# Patient Record
Sex: FEMALE | Race: WHITE
[De-identification: names, ages, dates, MRNs, and addresses within clinical notes are randomized per-mention and may not be internally consistent; named-entity substitution may affect disease eponyms.]

---

## 2021-09-20 ENCOUNTER — HOSPITAL ENCOUNTER (OUTPATIENT)
Dept: HOSPITAL 11 - JP.SDS | Age: 41
LOS: 1 days | Discharge: HOME | End: 2021-09-21
Attending: ORTHOPAEDIC SURGERY
Payer: COMMERCIAL

## 2021-09-20 DIAGNOSIS — Z79.899: ICD-10-CM

## 2021-09-20 DIAGNOSIS — Z20.822: ICD-10-CM

## 2021-09-20 DIAGNOSIS — Z01.812: ICD-10-CM

## 2021-09-20 DIAGNOSIS — M17.11: Primary | ICD-10-CM

## 2021-09-20 PROCEDURE — 80053 COMPREHEN METABOLIC PANEL: CPT

## 2021-09-20 PROCEDURE — U0002 COVID-19 LAB TEST NON-CDC: HCPCS

## 2021-09-20 PROCEDURE — 97161 PT EVAL LOW COMPLEX 20 MIN: CPT

## 2021-09-20 PROCEDURE — 97110 THERAPEUTIC EXERCISES: CPT

## 2021-09-20 PROCEDURE — C1776 JOINT DEVICE (IMPLANTABLE): HCPCS

## 2021-09-20 PROCEDURE — 97530 THERAPEUTIC ACTIVITIES: CPT

## 2021-09-20 PROCEDURE — 27446 REVISION OF KNEE JOINT: CPT

## 2021-09-20 PROCEDURE — 97535 SELF CARE MNGMENT TRAINING: CPT

## 2021-09-20 PROCEDURE — 81025 URINE PREGNANCY TEST: CPT

## 2021-09-20 PROCEDURE — 97165 OT EVAL LOW COMPLEX 30 MIN: CPT

## 2021-09-20 PROCEDURE — 36415 COLL VENOUS BLD VENIPUNCTURE: CPT

## 2021-09-20 PROCEDURE — C1713 ANCHOR/SCREW BN/BN,TIS/BN: HCPCS

## 2021-09-20 PROCEDURE — 73560 X-RAY EXAM OF KNEE 1 OR 2: CPT

## 2021-09-20 PROCEDURE — 87635 SARS-COV-2 COVID-19 AMP PRB: CPT

## 2021-09-20 PROCEDURE — 85027 COMPLETE CBC AUTOMATED: CPT

## 2021-09-20 RX ADMIN — OXYCODONE HYDROCHLORIDE AND ACETAMINOPHEN PRN TAB: 5; 325 TABLET ORAL at 12:09

## 2021-09-20 RX ADMIN — OXYCODONE HYDROCHLORIDE AND ACETAMINOPHEN PRN TAB: 5; 325 TABLET ORAL at 16:15

## 2021-09-20 RX ADMIN — Medication SCH APPLIC: at 08:09

## 2021-09-20 RX ADMIN — OXYCODONE HYDROCHLORIDE AND ACETAMINOPHEN PRN TAB: 5; 325 TABLET ORAL at 20:20

## 2021-09-20 RX ADMIN — Medication SCH APPLIC: at 20:19

## 2021-09-20 RX ADMIN — Medication SCH: at 14:55

## 2021-09-20 NOTE — CR
Knee 1V or 2V Rt

 

CLINICAL HISTORY: Arthroplasty

 

FINDINGS: Patient is status post the medial hemiarthroplasty. Components appear

well seated. There is intra-articular and subcutaneous air.

## 2021-09-21 VITALS — SYSTOLIC BLOOD PRESSURE: 107 MMHG | HEART RATE: 66 BPM | DIASTOLIC BLOOD PRESSURE: 62 MMHG

## 2021-09-21 RX ADMIN — OXYCODONE HYDROCHLORIDE AND ACETAMINOPHEN PRN TAB: 5; 325 TABLET ORAL at 05:15

## 2021-09-21 RX ADMIN — OXYCODONE HYDROCHLORIDE AND ACETAMINOPHEN PRN TAB: 5; 325 TABLET ORAL at 00:42

## 2021-09-21 RX ADMIN — OXYCODONE HYDROCHLORIDE AND ACETAMINOPHEN PRN TAB: 5; 325 TABLET ORAL at 13:26

## 2021-09-21 RX ADMIN — Medication SCH APPLIC: at 10:00

## 2021-09-21 RX ADMIN — OXYCODONE HYDROCHLORIDE AND ACETAMINOPHEN PRN TAB: 5; 325 TABLET ORAL at 17:17

## 2021-09-21 RX ADMIN — OXYCODONE HYDROCHLORIDE AND ACETAMINOPHEN PRN TAB: 5; 325 TABLET ORAL at 09:16

## 2021-09-21 NOTE — PCM.[id number]
**Discharge Summary





- Hospital Course


Brief History: Patient is a pleasant 39 y/o female, chronic right knee pain with

symptoms refractory to conservative management. Xrays revealed osteoarthritic 

changes of the medial compartment, well preserved spacing of the lateral and 

patellofemoral compartments. Elected to undergo a partial knee arthroplasty of 

the medial compartment. Tolerated surgery well with no complications. No acute 

events post-operatively. Discharged to home with outpatient physical therapy 

orders.


Diagnosis: Stroke: No


Modified [place] Scale: No Symptoms at All


Modified [place] Scale Score: 0





- Discharge Data


Discharge Date: 09/21/21


Discharge Disposition: Home, Self-Care 01


Condition: Good





- Referral to Home Health


Date of Face to Face Encounter: 09/21/21


Reason for Homebound Status: motivated to go home. Independent with ADLs


Primary Care Physician: 


Patience Richard MD








- Discharge Diagnosis/Problem(s)


(1) Status post right partial knee replacement


SNOMED Code(s): 025703606, [id number], [id number], [id number]


   ICD Code: Z96.651 - PRESENCE OF RIGHT ARTIFICIAL KNEE JOINT   Status: Acute  

Current Visit: Yes   





- Patient Summary/Data


Operative Procedure(s) Performed: right partial knee medial compartment 

arthroplasty


Consults: 


                                  Consultations





09/20/21 11:09


Consult to Case Management/ [CONS] Routine 


   Comment: 


   Physician Instructions: 


   Service(s) to be Consulted: Case Management


   Reason for Consult: Plan for Discharge


OT Evaluation and Treatment [CONS] Routine 


   Please Evaluate and Treat.


   OT Reason for Consult: ADL's


   Special Instructions: s/p right medial partial knee arthroplasty


   This query below is only for informational purposes and is not editable.


PT Evaluation and Treatment [CONS] Routine 


   Please Evaluate and Treat.


   PT Reason for Consult: Post op Ortho Surgery


   Special Instructions: s/p right medial partial knee arthroplasty


   This query below is only for informational purposes and is not editable.


PT Evaluation and Treatment [CONS] Routine 


   Please Evaluate and Treat.


   PT Reason for Consult: Post op Ortho Surgery Knee


   Pending Discharge: Yes, 1- 2 days


   Special Instructions: Schedule first outpatient PT appointment in 3-5 day 

post discharge.


   This query below is only for informational purposes and is not editable.











Hospital Course: 





Patient is a pleasant 40-year-old female, status post right knee medial 

compartment arthroplasty. Patient tolerated surgery well with no complications. 

No acute events during the postoperative hospitalization. Patient was 

hemodynamically stable throughout hospital stay, vitals remained within 

acceptable limits. Postop day #1 hemoglobin stable at 12.0. White count slightly

 elevated at 11.4. Patient denied subjective fevers, chills, fatigue, dyspnea, 

nor chest pain. Has been using incentive spirometer. 





Patient does take naltrexone per baseline. This medication was held 

postoperatively to allow for adequate utilization of pain control medications. 

Did initially have some nausea with administration of Percocet, but this impr

jony when medication was administered with food. Patient tolerated regular diet 

well. Denied any further episodes of nausea or emesis. 





Patient participated in physical therapy daily while hospitalized. Patient's 

ambulation abilities progressed nicely, utilizing four-wheel walker and 

ambulating up to 100 ft. Was able to safely demonstrate ability to complete 

stairs with therapy prior to discharge, as patient has 19 stairs in her house. 

Demonstrated competency with transfers from bed to chair. Demonstrated 

competency completing ADLs with minimal assistance. 





IV was saline locked the evening of surgery. Dressing change performed postop 

day #1 by orthopedic provider.





Exam: R LE neurovascular intact. Posterior tibial pulse, 2+. Negative Homans 

sign. Postoperative diffuse swelling of knee, extending into calf. No 

significant pedal edema. Minimal ecchymosis along medial knee and calf. Incision

 is well approximated, steristrips intact above incision with mild dried richie

inage. No surrounding erythema nor active drainage. Mild warmth to touch of 

knee. ROM: 4-60. 








- Patient Instructions


Diet: Regular Diet as Tolerated


Activity: Apply Ice, Elevate Extremity, Full Weight Bearing, No Strenuous 

Activities


Driving: Do Not Drive


Showering/Bathing: Shower in AM


Wound/Incision Care: Keep Operative Site/Wound Site Clean and Dry, Change 

Dressing Daily (leave dressing on through 9/22/21. May then remove for shower. 

If no active drainage or irritation, does not need to change dressing daily. )


Notify Provider of: Fever, Increased Pain, Swelling and Redness, Drainage





- Discharge Plan


*PRESCRIPTION DRUG MONITORING PROGRAM REVIEWED*: Yes


*COPY OF PRESCRIPTION DRUG MONITORING REPORT IN PATIENT NINFA: Not Applicable


Prescriptions/Med Rec: 


Aspirin [Aspirin EC] 325 mg PO BID #60


Acetaminophen/oxyCODONE [Percocet 325-5 MG] 1 - 2 tab PO Q6HR PRN #40 tab


 PRN Reason: Pain


Home Medications: 


                                    Home Meds





Cholecalciferol (Vitamin D3) [Vitamin D3] 5,000 unit PO DAILY 01/02/20 [History]


Progesterone, Micronized [Progesterone] 100 mg PO DAILY 01/02/20 [History]


Sertraline [Zoloft] 100 mg PO DAILY 01/02/20 [History]


Naltrexone 50 mg PO DAILY 08/26/21 [History]


Progesterone/Testosterone 1 trouche PO DAILY 09/20/21 [History]


Vit A/D3/E/Iod/Zinc/Selen/Herb [Medcaps T3 Capsule] 1 tab PO DAILY 09/20/21 

[History]


Acetaminophen/oxyCODONE [Percocet 325-5 MG] 1 - 2 tab PO Q6HR PRN #40 tab 

09/21/21 [Rx]


Aspirin [Aspirin EC] 325 mg PO BID #60 09/21/21 [Rx]








Oxygen Therapy Mode: Room Air


Patient Handouts:  Preventing Problems After Surgery, How to Prevent 

Constipation After Surgery, Partial Knee Replacement, Care After


Referrals: 


Deny Nolen, PT [Physical Therapist] - 09/28/21 1:00 pm (Please arrive 15 

minutes early to register for your appointment.)


Zeny Ewing PA [Ordering Only Provider] - 10/05/21 1:00 pm (Please arrive 15

minutes early to register for your appointment.)





- Discharge Summary/Plan Comment


DC Time >30 min.: No


Total # of Minutes for Discharge Time: 





20


Discharge Summary/Plan Comment: 





-Discharge to home this afternoon. Outpatient PT has been arranged


-Prescription sent for pain control: 5mg-325mg Percocet, 1-2 tabs PO q6 hrs prn 

for pain. Instructed to discontinue home-Naltrexone while on this medication. 

Wait 3-4 days after discontinuing pain medication prior to resuming Naltrexone


-Encouraged continuation of stool softener while on opioid pain medication 


-Educated patient on warning signs of DVT/VTE and SSI; any concerns, she should 

contact the clinic or visit her local ER


-Educated to take 1 aspirin (81 mg or 325 mg) BID for DVT/VTE prophylaxis


-Continue with Nozin spray BID  


-May shower; leave Steristrips on and let water run over the top. They will fall

off in 5-7 days. Do not vigorously scrub incision, no submerging incision in 

bath water. Do not need to place dressing over incision, but may choose to do so

if Steristrips are catching on clothing. 


-Follow up with orthopedics in 2 weeks; contact clinic with any concerns or 

questions that arise prior to scheduled apt. 





- General Info


Date of Service: 09/21/21


Admission Dx/Problem (Free Text: 





s/p right knee partial arthroplasty 


Functional Status: Reports: Pain Controlled, Tolerating Diet, Ambulating (with 

FWW), Urinating, Incentive Spirometry





- Review of Systems


General: Denies: Fever, Fatigue, Malaise, Chills


Pulmonary: Denies: Shortness of Breath


Cardiovascular: Denies: Chest Pain, Palpitations, Lightheadedness


Gastrointestinal: Reports: No Symptoms


Genitourinary: Reports: No Symptoms


Musculoskeletal: Reports: Leg Pain (right ), Joint Pain (right knee ), Joint 

Swelling (right knee )


Skin: Reports: Bruising


Neurological: Reports: No Symptoms


Psychiatric: Reports: No Symptoms





- Patient Data


Vitals - Most Recent: 


                                Last Vital Signs











Temp  96.2 F L  09/21/21 07:21


 


Pulse  66   09/21/21 07:21


 


Resp  16   09/21/21 07:21


 


BP  107/62   09/21/21 07:21


 


Pulse Ox  97   09/21/21 07:21











Weight - Most Recent: 171 lb


Lab Results - Last 24 hrs: 


                         Laboratory Results - last 24 hr











  09/21/21 Range/Units





  05:54 


 


WBC  11.4 H  (4.5-11.0)  K/uL


 


RBC  4.07  (3.30-5.50)  M/uL


 


Hgb  12.0  (12.0-15.0)  g/dL


 


Hct  35.4 L  (36.0-48.0)  %


 


MCV  87  (80-98)  fL


 


MCH  30  (27-31)  pg


 


MCHC  34  (32-36)  %


 


Plt Count  279  (150-400)  K/uL











Med Orders - Current: 


                               Current Medications





Acetaminophen (Acetaminophen 325 Mg Tab)  650 mg PO Q4H PRN


   PRN Reason: Pain/Fever


Hydrocodone Bitart/Acetaminophen (Acetaminophen/Hydrocodone 325-5 Mg Tab)  1 tab

PO Q4H PRN


   PRN Reason: Pain (mild 1-3)


Bandage/Support Products (Nozin Nasal )  1 applic NASBOTH BID CARINE


   Last Admin: 09/21/21 10:00 Dose:  1 applic


   Documented by: 


Docusate Sodium (Docusate Sodium 100 Mg Cap)  100 mg PO BID [facility]


   Last Admin: 09/21/21 09:59 Dose:  100 mg


   Documented by: 


Enoxaparin Sodium (Enoxaparin 30 Mg/0.3 Ml Syringe)  30 mg SUBCUT DAILY [facility]


   Last Admin: 09/21/21 09:58 Dose:  30 mg


   Documented by: 


Sodium Chloride (Normal Saline)  1,000 mls @ 125 mls/hr IV ASDIRECTED [facility]


Ketorolac Tromethamine (Ketorolac 30 Mg/Ml Sdv)  15 mg IVPUSH Q8H PRN


   PRN Reason: Breakthrough Pain


   Stop: 09/24/21 11:10


   Last Admin: 09/20/21 13:42 Dose:  15 mg


   Documented by: 


Morphine Sulfate (Morphine 2 Mg/Ml Syringe)  1 mg IVPUSH Q1H PRN


   PRN Reason: Breakthrough Pain


   Last Admin: 09/20/21 14:41 Dose:  1 mg


   Documented by: 


Non-Formulary Medication (Progesterone, Micronized [Progesterone])  100 mg PO 

DAILY [facility]


Progesterone/Testosterone Trouche **Ptom**  0 trouche BUCCAL BEDTIME [facility]


Ondansetron HCl (Ondansetron 4 Mg/2 Ml Sdv)  4 mg IVPUSH Q6H PRN


   PRN Reason: Nausea/Vomiting


Oxycodone/Acetaminophen (Acetaminophen/Oxycodone 325-5 Mg Tab)  1 - 2 tab PO Q4H

 PRN


   PRN Reason: Pain


   Last Admin: 09/21/21 09:16 Dose:  2 tab


   Documented by: 


Sertraline HCl (Sertraline 50 Mg Tab)  100 mg PO DAILY [facility]


   Last Admin: 09/21/21 09:58 Dose:  100 mg


   Documented by: 





Discontinued Medications





Bandage/Support Products (Nozin Nasal )  1 applic NASBOTH BID [facility]


   Last Admin: 09/20/21 14:55 Dose:  Not Given


   Documented by: 


Bupivacaine HCl (Bupivacaine 0.5% 30 Ml Sdv) Confirm Administered Dose 30 ml 

.ROUTE .STK-MED ONE


   Stop: 09/20/21 06:50


Fentanyl (Fentanyl 100 Mcg/2 Ml Sdv) Confirm Administered Dose 100 mcg .ROUTE 

.STK-MED ONE


   Stop: 09/20/21 08:38


Cefazolin Sodium/Dextrose 2 gm (/ Premix)  50 mls @ 100 mls/hr IV ONETIME ONE


   Stop: 09/20/21 08:59


   Last Admin: 09/20/21 09:35 Dose:  100 mls/hr


   Documented by: 


Lactated Ringer's (Ringers, Lactated)  1,000 mls @ 75 mls/hr IV ASDIRECTED [facility]


   Last Admin: 09/20/21 08:03 Dose:  75 mls/hr


   Documented by: 


Lactated Ringer's (Ringers, Lactated) Confirm Administered Dose 1,000 mls @ as 

directed .ROUTE .STK-MED ONE


   Stop: 09/20/21 10:55


Cefazolin Sodium/Dextrose 1 gm (/ Premix)  50 mls @ 100 mls/hr IV Q8H [facility]


   Stop: 09/21/21 09:29


   Last Admin: 09/21/21 10:00 Dose:  100 mls/hr


   Documented by: 


Midazolam HCl (Midazolam 1 Mg/Ml 2 Ml Sdv) Confirm Administered Dose 2 mg .ROUTE

 .STK-MED ONE


   Stop: 09/20/21 08:39


Midazolam HCl (Midazolam 1 Mg/Ml 2 Ml Sdv) Confirm Administered Dose 2 mg .ROUTE

 .STK-MED ONE


   Stop: 09/20/21 10:04


Povidone Iodine (Povidone-Iodine 10% Soln 118.25 Ml Bottle) Confirm Administered

 Dose 1 ml .ROUTE .STK-MED ONE


   Stop: 09/20/21 06:50


   Last Admin: 09/20/21 10:17 Dose:  30 ml


   Documented by: 


Propofol (Propofol 200 Mg/20 Ml Sdv) Confirm Administered Dose 200 mg .ROUTE 

.STK-MED ONE


   Stop: 09/20/21 08:38


Propofol (Propofol 200 Mg/20 Ml Sdv) Confirm Administered Dose 200 mg .ROUTE .ST

K-MED ONE


   Stop: 09/20/21 09:59











- Exam


Quality Assessment: Reports: DVT Prophylaxis


General: Reports: Alert, Oriented, Cooperative, No Acute Distress


Extremities: No Pedal Edema, Joint Swelling (right knee ), Leg Pain (right ), 

Limited Range of Motion (right knee, due to postoperative swelling, bulky 

dressing, and pain ).  No: Tee's Sign


Skin: Reports: Dry, Intact, Ecchymosis


Wound/Incisions: Reports: Healing Well, Dressing Dry and Intact, No Drainage


Neurological: Reports: No New Focal Deficit


Psy/Mental Status: Reports: Alert, Normal Affect, Normal Mood

## 2021-10-04 NOTE — OR
DATE OF PROCEDURE:  09/20/2021

 

SURGEON:  Christiano Ceja MD

 

PREOPERATIVE DIAGNOSES:

1. Osteoarthritis, medial compartment, right knee.

2. Chondromalacia, trochlea, grade 2 and 3.

 

PROCEDURE:  Right medial unicompartmental arthroplasty using [employer] ZUK components

with a size D femur, size 2 tibia and 8 mm polyethylene.

 

ASSISTANT:  VANESSA Ku.

 

ANESTHESIA:  Spinal with sedation.

 

INDICATIONS:  Tish is a 40-year-old female who has had persistent difficulty with her

right knee for the past few years.  She has tried multiple conservative measures including

activity modification, physical therapy, and injections.  Imaging reveals only moderate

medial collapse; however, MRI shows areas of full-thickness cartilage loss in the medial

femoral condyle and subchondral changes.  She therefore presents for medial unicompartmental

arthroplasty as it was felt that arthroscopic and/or osteochondral grafting would be

insufficient or inappropriate for her condition.  Risks, benefits, potential complications

of the procedure were discussed.

 

First assistant, surgical services of a physician assistant were utilized for skilled

retraction, exposure, leg manipulation and wound closure.

 

DESCRIPTION OF PROCEDURE:  After adequate anesthesia was obtained, the patient placed supine

with a tourniquet about the right upper thigh.  Right leg was prepped and draped in a

sterile fashion.  Leg was exsanguinated and tourniquet inflated to 300 mmHg pressure.  A

longitudinal incision was made just medial of midline over the patella and carried down

through the subcutaneous tissue.  A medial patellar arthrotomy was performed from the tibial

tubercle to the insertion of the VMO.  Portion of the fat pad was excised for visualization.

Anterior horn of the meniscus is excised.  The knee was flexed and articular surfaces are

inspected which reveals area of grade 4 full-thickness loss on the majority of the

weightbearing surface of the medial femoral condyle.  Further evaluation of the knee

revealed a smooth patella.  She did however have an area of grade 2 and early grade 3

changes in the central trochlea measuring just over a centimeter in diameter with some loose

articular flaps around the periphery.  The decision was made to proceed with the

unicompartmental arthroplasty.  With the knee flexed, the anterior lip of the tibial plateau

was resected with an oscillating saw.  The knee was extended, and the extramedullary

alignment jig was placed, aligned and secured to the tibia and femur.  Distal femoral cut

was made.  This portion of the guide was removed.  The knee was flexed and the proximal

tibia was then resected with a combination of reciprocating and oscillating saws.  Remaining

portion of the medial meniscus was excised.  Femur was sized to size D component.  D cutting

guide was secured.  Peg holes were drilled and remaining cuts were made.  This was removed

and a trial femoral component was placed with excellent fit.  The femur was sized to a #2

component.  Base plate was tapped into position and secured with a small pin.  Peg holes

were drilled and trial reduction was then made with 8 mm polyethylene.  This provided full

extension, excellent flexion and good balance in both flexion and extension with a 2 mm gap.

Just slightly tight with a 3 mm gap.  Trials were removed and the knee was thoroughly

irrigated with pulse lavage.

 

Bone surfaces were dried.  Components were cemented in place.  Excess cement was removed and

the knee was held in full extension with an 8 mm trial and the 2 mm spacer as the cement

cured.  Knee was again taken through range of motion upon a very good balance in flexion and

extension and the trial was removed.  The knee was irrigated and the final polyethylene was

snapped into position.  The knee was irrigated with a pulse lavage followed by dilute

Betadine solution which was left in place for 2 minutes and again with the pulse lavage.

The trochlear lesion was lightly debrided around the periphery with a 15 blade removing the

loose articular flaps and bevelling these flush.  Knee was then closed with #2 Ethibond in a

running fashion in the capsular layer, 2-0 Vicryl and a running 3-0 Monocryl on the skin.

Steri-Strips were applied.  Light compressive dressing was then placed.  The patient

tolerated procedure well.  There were no complications.  Taken from the operating room in

stable condition.

 

 

 

 

Christiano Ceja MD

DD:  10/04/2021 15:37:06

DT:  10/04/2021 21:04:55

Job #:  [id number]